# Patient Record
Sex: FEMALE | Race: WHITE | NOT HISPANIC OR LATINO | Employment: OTHER | URBAN - METROPOLITAN AREA
[De-identification: names, ages, dates, MRNs, and addresses within clinical notes are randomized per-mention and may not be internally consistent; named-entity substitution may affect disease eponyms.]

---

## 2017-04-26 ENCOUNTER — OFFICE VISIT (OUTPATIENT)
Dept: CARDIOLOGY | Facility: CLINIC | Age: 72
End: 2017-04-26

## 2017-04-26 VITALS
HEART RATE: 101 BPM | BODY MASS INDEX: 33.32 KG/M2 | SYSTOLIC BLOOD PRESSURE: 118 MMHG | WEIGHT: 200 LBS | HEIGHT: 65 IN | DIASTOLIC BLOOD PRESSURE: 82 MMHG

## 2017-04-26 DIAGNOSIS — I27.0 IDIOPATHIC PULMONARY HYPERTENSION (HCC): ICD-10-CM

## 2017-04-26 DIAGNOSIS — I48.3 TYPICAL ATRIAL FLUTTER (HCC): Primary | ICD-10-CM

## 2017-04-26 DIAGNOSIS — I42.9 CARDIOMYOPATHY (HCC): ICD-10-CM

## 2017-04-26 PROCEDURE — 99213 OFFICE O/P EST LOW 20 MIN: CPT | Performed by: INTERNAL MEDICINE

## 2017-04-26 PROCEDURE — 93000 ELECTROCARDIOGRAM COMPLETE: CPT | Performed by: INTERNAL MEDICINE

## 2017-04-26 RX ORDER — CARVEDILOL 6.25 MG/1
6.25 TABLET ORAL 2 TIMES DAILY WITH MEALS
COMMUNITY

## 2017-04-26 NOTE — PROGRESS NOTES
Ashleigh GONZALES Rebollar  1945  [phone]      04/26/2017    [facility] CARDIOLOGY     Clarence Smith MD  [address] [facility]  [place] [address]    Chief Complaint   Patient presents with   • Palpitations       Problem List:   Problem List:   1. Atrial Flutter:   a. Multiple episodes of SVT with ER visits at [facility] with EKGs demonstrating, SVT on 07/07/2015, at 160 beats per minute, and 09/05/2015, 160 beats per minute; patient may have received adenosine, records unclear.   b. EPS 10/2015: FRA of RA isthmus dependent atrial flutter by Dr. Beck.   2. Reported paroxysmal atrial fibrillation:   a. CHADS VASc score=4, on Xarelto.   b. Diagnosed approximately, 2013.   c. Left heart catheterization July 2013, reported EF 40%, nonobstructive disease by Dr. Adams, data deficit.   d. Reported atrial flutter with RVR with elevated troponin, and subsequent left heart catheterization July 2015, no significant coronary artery disease, EF 30% to 40%.  e. Echocardiogram, 07/08/2015: EF greater than 55%, RV pressure/volume overload/severe TR, RVSP 80-90 mmHg.   f. A 24-hour Holter monitor   3. History of cardiomyopathy:   a. Left heart catheterization in 2013, reported EF 40%.   b. Left heart catheterization 07/15/2015: EF 30% to 40%.   c. Echocardiogram, 07/08/2015: EF greater than 55%.   4. Idiopathic pulmonary hypertension:   a. Followed by Dr. Adams, on Adcirca and home O2.   b. Echocardiogram, 07/08/2015, RVSP 80-90 mmHg, severe TR.   c. Patient has COPD, not tested for sleep apnea.   5. Hypertension.   6. Dyslipidemia.   7. Chronic kidney disease, (baseline 1.4-1.5).  8. Chronic obstructive pulmonary disease, on chronic home O2.   9. Hypothyroidism.   10. History of hematoma/necrosis of left lower extremity, status post vein graft x3 in 2013.   11. History of thrombocytopenia.   12. History of DVT of the left lower extremity.   13. Depression/anxiety.    14. GERD.   15. Vitamin D deficiency.   16. Surgical history:   a. Skin graft x3 to the left lower extremity.   b.  x3.   c. Cholecystectomy.   d. Bilateral knee replacement.   e. Tubal ligation.  f. Appendectomy       Allergies  Allergies   Allergen Reactions   • Amiodarone    • Codeine    • Epinephrine    • Lidocaine    • Penicillins        Current Medications    Current Outpatient Prescriptions:   •  albuterol (PROVENTIL) (2.5 MG/3ML) 0.083% nebulizer solution, Take 2.5 mg by nebulization every 4 (four) hours as needed for wheezing., Disp: , Rfl:   •  aspirin 81 MG EC tablet, Take 81 mg by mouth daily., Disp: , Rfl:   •  atorvastatin (LIPITOR) 40 MG tablet, Take 40 mg by mouth daily., Disp: , Rfl:   •  baclofen (LIORESAL) 10 MG tablet, Take 10 mg by mouth 2 (two) times a day., Disp: , Rfl:   •  carvedilol (COREG) 6.25 MG tablet, Take 6.25 mg by mouth 2 (Two) Times a Day With Meals., Disp: , Rfl:   •  cholecalciferol (VITAMIN D3) 1000 UNITS tablet, Take 4,000 Units by mouth daily., Disp: , Rfl:   •  diltiazem CD (CARDIZEM CD) 180 MG 24 hr capsule, Take 180 mg by mouth daily., Disp: , Rfl:   •  DULoxetine (CYMBALTA) 60 MG capsule, Take 60 mg by mouth daily., Disp: , Rfl:   •  fluticasone (FLONASE) 50 MCG/ACT nasal spray, 2 sprays into each nostril daily., Disp: , Rfl:   •  furosemide (LASIX) 80 MG tablet, Take 80 mg by mouth daily., Disp: , Rfl:   •  levothyroxine (SYNTHROID, LEVOTHROID) 25 MCG tablet, Take 25 mcg by mouth daily. 1/2 po daily, Disp: , Rfl:   •  loratadine (CLARITIN) 10 MG tablet, Take 10 mg by mouth daily., Disp: , Rfl:   •  Multiple Vitamins-Minerals (MULTIVITAMIN ADULT PO), Take  by mouth daily., Disp: , Rfl:   •  O2 (OXYGEN), Inhale 1 (one) time., Disp: , Rfl:   •  omeprazole (PriLOSEC) 20 MG capsule, Take 20 mg by mouth daily., Disp: , Rfl:   •  potassium chloride (K-DUR,KLOR-CON) 20 MEQ CR tablet, Take 20 mEq by mouth daily., Disp: , Rfl:   •  pregabalin (LYRICA) 150 MG capsule,  "Take 150 mg by mouth 2 (two) times a day., Disp: , Rfl:   •  rivaroxaban (XARELTO) 15 MG tablet, Take 15 mg by mouth daily., Disp: , Rfl:   •  tadalafil (ADCIRCA) 20 MG tablet tablet, Take 40 mg by mouth daily., Disp: , Rfl:   •  tiotropium (SPIRIVA) 18 MCG per inhalation capsule, Place 1 capsule into inhaler and inhale 1 (one) time daily., Disp: , Rfl:     History of Present Illness   HPI    Pt presents for follow up of atrial arrthymias. Since we last saw the pt, pt denies any AF episodes,  CP, LH, and dizziness. Denies any hospitalizations, major bleeding other than nosebleeds on Xarelto, or TIA/CVA symptoms. Overall feels well. She has chronic hypoxia and is scheduled to see a pulmonologist in the near future. No syncope.     ROS:  General:  + fatigue, weight gain or loss  Cardiovascular:  Denies CP, PND, syncope, near syncope, edema or palpitations.  Pulmonary:  + MCMULLEN,-  cough, or wheezing      Vitals:    04/26/17 1551   BP: 118/82   BP Location: Left arm   Patient Position: Sitting   Pulse: 101   Weight: 200 lb (90.7 kg)   Height: 65\" (165.1 cm)     PE:  General: NAD  Neck: no JVD, no carotid bruits, no TM  Heart RRR slightly tachy, NL S1, S2, S4 present, no rubs, TR murmur  Lungs: CTA w rare  wheezes,  Abd: soft, non-tender, NL BS  Ext: No musculoskeletal deformities, +2  edema, No cyanosis, or clubbing  Psych: normal mood and affect    Diagnostic Data:    ECG 12 Lead  Date/Time: 4/26/2017 4:32 PM  Performed by: TONI BENITEZ  Authorized by: TONI BENITEZ   Previous ECG: no previous ECG available  Rhythm: sinus tachycardia  BPM: 102  Conduction: complete RBBB            1. Typical atrial flutter    2. Cardiomyopathy    3. Idiopathic pulmonary hypertension          Plan:  1) AFlutter s/p RFA- doing well   Continue present medications.   2) Anticoagulation  Continue Xarelto  3) Cardiomyopathy- recent echo with Dr. Hope. Will get results  4) Pulmonary HTN- will follow up with Dr. Guzman    F/up " prn    Scribed for Raz Beck MD by Zoe Yadav PA-C. 4/26/2017  4:17 PM     I, Raz Beck MD, personally performed the services face to face as described in this documentation and as scribed by the above named individual in my presence, and it is both accurate and complete.  4/26/2017  4:35 PM